# Patient Record
Sex: FEMALE | Race: WHITE
[De-identification: names, ages, dates, MRNs, and addresses within clinical notes are randomized per-mention and may not be internally consistent; named-entity substitution may affect disease eponyms.]

---

## 2019-03-19 ENCOUNTER — HOSPITAL ENCOUNTER (OUTPATIENT)
Dept: HOSPITAL 80 - FIMAGING | Age: 84
End: 2019-03-19
Attending: FAMILY MEDICINE
Payer: COMMERCIAL

## 2019-03-19 DIAGNOSIS — M25.552: Primary | ICD-10-CM

## 2019-03-19 DIAGNOSIS — S32.592A: ICD-10-CM
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
## 2019-03-29 ENCOUNTER — HOSPITAL ENCOUNTER (EMERGENCY)
Dept: HOSPITAL 80 - FED | Age: 84
Discharge: HOME | End: 2019-03-29
Payer: COMMERCIAL

## 2019-03-29 VITALS — SYSTOLIC BLOOD PRESSURE: 229 MMHG | DIASTOLIC BLOOD PRESSURE: 79 MMHG

## 2019-03-29 DIAGNOSIS — F03.90: ICD-10-CM

## 2019-03-29 DIAGNOSIS — I10: Primary | ICD-10-CM

## 2019-03-29 PROCEDURE — 96374 THER/PROPH/DIAG INJ IV PUSH: CPT

## 2019-03-29 PROCEDURE — 99284 EMERGENCY DEPT VISIT MOD MDM: CPT

## 2019-03-29 NOTE — EDPHY
H & P


Time Seen by Provider: 03/29/19 21:58


Constitutional: 


 Initial Vital Signs











Temperature (C)  36.8 C   03/29/19 22:21


 


Heart Rate  72   03/29/19 22:21


 


Respiratory Rate  18   03/29/19 22:21


 


Blood Pressure  221/119 H  03/29/19 22:21


 


O2 Sat (%)  92   03/29/19 22:21








 











O2 Delivery Mode               Room Air














Allergies/Adverse Reactions: 


 





No Known Allergies Allergy (Unverified 03/29/19 21:58)


 








Home Medications: 














 Medication  Instructions  Recorded


 


Aspirin  03/29/19


 


Ativan  03/29/19


 


Cetaphil  03/29/19


 


Donepezil HCl  03/29/19


 


FLUoxetine  03/29/19


 


Lisinopril  03/29/19


 


MIRTAZAPINE  03/29/19


 


Metoprolol Succinate  03/29/19


 


Omeprazole  03/29/19


 


Tylenol  03/29/19














Medical Decision Making


ED Course/Re-evaluation: 





CHIEF COMPLAINT: Hypertension





HISTORY OF PRESENT ILLNESS:  


The patient is an 85 y/o female with a history of hypertension and dementia 

arriving via EMS for hypertension. The patient is unsure when she last took her 

blood pressure medication. Per her chart, she takes Lisinopril for her 

hypertension. She reports she is feeling okay and has no complaints. No fever, 

headache, body aches, lightheadedness, chest pain, heart palpitations, 

shortness of breath, cough, abdominal pain, urinary or bowel complaints, 

numbness, paresthesias.





REVIEW OF SYSTEMS:  





A comprehensive 10 system review of systems is otherwise negative aside from 

elements mentioned in the history of present illness and medical decision 

making.





PHYSICAL EXAM:  





HR, BP, O2 Sat, RR.  Temp noted


General Appearance:  Alert, well hydrated, appropriate, and non-toxic appearing.


Head:  Atraumatic without scalp tenderness or obvious injury


Eyes:  Pupils equal, round, reactive to light and accommodation, EOMI, no trauma

, no injection.


Ears:  Clear bilaterally, no perforation, normal landmarks


Nose:  Atraumatic, no rhinorrhea, clear.


Throat:  There is no erythema or exudates, no lesions, normal tonsils, mucus 

membranes moist.


Neck:  Supple, 2+ carotid upstroke, nontender, no lymphadenopathy.


Respiratory:  No retractions, no distress, no wheezes, and no accessory muscle 

use.  Lungs are clear to auscultation bilaterally.


Cardiovascular:  Regular rate and rhythm, no murmurs, rubs, or gallops. 

Bilateral carotid, radial, dorsalis pedis, and posterior tibial pulses intact. 

Good capillary refill all extremities.


Gastrointestinal:  Abdomen is soft, nontender, non-distended, no masses, no 

rebound, no guarding, no peritoneal signs.


Musculoskeletal:  Normal active ROM of all extremities, atraumatic.


Neurological:  Alert and interactive.  The patient has normal DTRs and non-

focal cranial nerves, motor, sensory, and cerebellar exam.


Skin:  No rashes, good turgor, no nodules on palpation.





Past medical history: Hypertension and dementia


Past surgical history: Unknown


Family history: Unknown


Social history: Resides at [facility], retired, single





DIAGNOSTICS/PROCEDURES/CRITICAL CARE TIME:  


Not indicated.





DIFFERENTIAL DIAGNOSIS:   


The differential diagnosis for the patient's hypertension included but was not 

limited to hypertension, hypertension urgency, hypertension emergency, and 

missed medicines.





MEDICAL DECISION MAKING:  


The patient is an 85 y/o female with a history of hypertension and dementia 

arriving via EMS for hypertension. The patient is unsure when she last took her 

blood pressure medication. She reports she is feeling okay and has no 

complaints. Patient's current blood pressure is 221/119. She is not having a 

hypertensive crisis or emergency. There are no signs of end organ damage. 20mg 

PO Lisinopril administered. Return precautions provided; patient is comfortable 

with this plan.








- Data Points


Medications Given: 


 








Discontinued Medications





Lisinopril (Zestril)  20 mg PO EDNOW ONE


   Stop: 03/29/19 22:08


   Last Admin: 03/29/19 22:12 Dose:  20 mg








Departure





- Departure


Disposition: Home, Routine, Self-Care


Clinical Impression: 


Hypertension


Qualifiers:


 Hypertension type: unspecified Qualified Code(s): I10 - Essential (primary) 

hypertension





Condition: Good


Instructions:  Hypertension (ED)


Additional Instructions: 


1. Take your medications as prescribed.


2. Follow-up with your primary doctor within 72 hours.  


3. Return to the Emergency Department for fever, chest pain, shortness of breath

, increasing pain or other worsening of condition.


[facility]errals: 


[facility]Clinic[facility] - As per Instructions


Report Scribed for: Glenn Burleson


Report Scribed by: Adalgisa Hobbs


Date of Report: 03/29/19


Time of Report: 21:59